# Patient Record
Sex: FEMALE | Race: WHITE
[De-identification: names, ages, dates, MRNs, and addresses within clinical notes are randomized per-mention and may not be internally consistent; named-entity substitution may affect disease eponyms.]

---

## 2017-07-29 ENCOUNTER — HOSPITAL ENCOUNTER (EMERGENCY)
Dept: HOSPITAL 17 - PHEFT | Age: 52
Discharge: HOME | End: 2017-07-29
Payer: COMMERCIAL

## 2017-07-29 VITALS
SYSTOLIC BLOOD PRESSURE: 119 MMHG | TEMPERATURE: 98.5 F | HEART RATE: 103 BPM | DIASTOLIC BLOOD PRESSURE: 66 MMHG | OXYGEN SATURATION: 97 % | RESPIRATION RATE: 20 BRPM

## 2017-07-29 DIAGNOSIS — M54.2: ICD-10-CM

## 2017-07-29 DIAGNOSIS — R51: ICD-10-CM

## 2017-07-29 DIAGNOSIS — M25.512: ICD-10-CM

## 2017-07-29 DIAGNOSIS — Z86.59: ICD-10-CM

## 2017-07-29 DIAGNOSIS — I10: ICD-10-CM

## 2017-07-29 DIAGNOSIS — M79.602: ICD-10-CM

## 2017-07-29 DIAGNOSIS — Z87.19: ICD-10-CM

## 2017-07-29 DIAGNOSIS — G58.8: Primary | ICD-10-CM

## 2017-07-29 PROCEDURE — 99283 EMERGENCY DEPT VISIT LOW MDM: CPT

## 2017-07-29 NOTE — PD
HPI


Chief Complaint:  Musculoskeletal Complaint


Time Seen by Provider:  16:35


Travel History


International Travel<30 days:  No


Contact w/Intl Traveler<30days:  No


Traveled to known affect area:  No





History of Present Illness


HPI


52-year-old female presents to the emergency room for evaluation of left-sided 

neck pain that radiates into the left upper shoulder and down her left arm for 

the past 2 days.  Patient woke up with the pain and believes she may have slept 

on her neck wrong.  She applied icy hot and took 400 mg ibuprofen yesterday and 

today without any relief in symptoms.  Pain is worsened with certain range of 

motion.  She has associated posterior headache from the strain.  She denies any 

trauma or injury to the area.  Denies paresthesias.  History of depression and 

hypertension.





PFSH


Past Medical History


Depression:  Yes


Diminished Hearing:  No


GERD:  Yes


Hypertension:  Yes


Pregnant?:  Not Pregnant





Past Surgical History


 Section:  Yes (X3)


Hysterectomy:  Yes (PARTIAL)


Tonsillectomy:  Yes


Other Surgery:  Yes (BLADDER LIFT)





Social History


Alcohol Use:  No


Tobacco Use:  No


Substance Use:  No





Allergies-Medications


(Allergen,Severity, Reaction):  


Coded Allergies:  


     Erythromycin (Verified  Allergy, Mild, NAUSEA, VOMITING, 17)


Reported Meds & Prescriptions





Reported Meds & Active Scripts


Active


Ibuprofen 600 Mg Tab 600 Mg PO Q8HR PRN


Robaxin (Methocarbamol) 750 Mg Tab 750 Mg PO Q8HR


Reported


Lisinopril 5 Mg Tab 5 Mg PO DAILY


Wellbutrin SR 12 HR (Bupropion HCl) 100 Mg Tab 300 Mg PO Q12HR








Review of Systems


Except as stated in HPI:  all other systems reviewed are Neg





Physical Exam


Narrative


GENERAL: Well-nourished, well-developed female in no acute distress.  Afebrile.

  Ambulatory.


SKIN: Focused skin assessment warm/dry.  No erythema or ecchymosis.


HEAD: Normocephalic.


EYES: No scleral icterus. No injection or drainage. 


NECK: Supple, trachea midline. No JVD or lymphadenopathy.  No midline 

tenderness.  Full range of motion.


CARDIOVASCULAR: Regular rate and rhythm without murmurs, gallops, or rubs. 


RESPIRATORY: Breath sounds equal bilaterally. No accessory muscle use.


MUSCULOSKELETAL: No cyanosis, or edema.  Strength 5/5 and equal in upper and 

lower extremities.  2+ radial pulse.  Full range of motion of the left upper 

extremity.  Radial, ulnar, and median nerves intact.


NEUROLOGICAL: Awake and alert. Cranial nerves II through XII intact.  Motor and 

sensory grossly within normal limits. Five out of 5 muscle strength in all 

muscle groups.  Normal speech.





Data


Data


Last Documented VS





Vital Signs








  Date Time  Temp Pulse Resp B/P Pulse Ox O2 Delivery O2 Flow Rate FiO2


 


17 16:22 98.5 103 20 119/66 97   











MDM


Medical Decision Making


Medical Screen Exam Complete:  Yes


Emergency Medical Condition:  Yes


Medical Record Reviewed:  Yes


Differential Diagnosis


Radiculopathy, strain, spasm


Narrative Course


52-year-old female presents to the emergency room for evaluation of left-sided 

neck pain radiating into her left shoulder and down the left upper extremity 

for the past 2 days.  Patient denies trauma or injury.  States she may have 

slept on it wrong.  Physical clinical exam is unremarkable.  No bony tenderness 

to palpation of the cervical spine or left shoulder.  2+ radial pulse.  Radial, 

ulnar, and median nerves intact.  Full range of motion of the shoulder and 

neck.  No indication for imaging at this time.  Patient discharged with 

prescriptions for ibuprofen and Robaxin and told to follow-up with a primary 

care physician or return for worsening symptoms.  She  understands and agrees 

to plan.





Diagnosis





 Primary Impression:  


 Pinched nerve in neck


Referrals:  


Primary Care Physician


Patient Instructions:  Cervical Strain (ED), General Instructions, Muscle Spasm 

(ED)





***Additional Instructions:


Rest and drink plenty of fluids.


Take Robaxin as directed, as needed for pain.


Take ibuprofen with food as directed, as needed for pain.


Apply ice to the affected area for 20 minutes at a time, as needed for pain and 

swelling.


Follow-up with a primary care physician.


Return to the emergency room for worsening symptoms.


***Med/Other Pt SpecificInfo:  Prescription(s) given


Scripts


Ibuprofen 600 Mg [id number] Mg PO Q8HR PRN (PAIN) #21 TAB  Ref 0


   Prov:Baltazar Godinez MD         17 


Methocarbamol (Robaxin)750 Mg [id number] Mg PO Q8HR  #21 TAB  Ref 0


   Prov:Baltazar Godinez MD         17


Disposition:  01 DISCHARGE HOME


Condition:  Stable








Juliana Gomez 2017 16:50

## 2018-05-26 ENCOUNTER — HOSPITAL ENCOUNTER (EMERGENCY)
Dept: HOSPITAL 17 - PHEFT | Age: 53
Discharge: HOME | End: 2018-05-26
Payer: COMMERCIAL

## 2018-05-26 VITALS
SYSTOLIC BLOOD PRESSURE: 119 MMHG | TEMPERATURE: 98.8 F | HEART RATE: 90 BPM | DIASTOLIC BLOOD PRESSURE: 68 MMHG | RESPIRATION RATE: 16 BRPM | OXYGEN SATURATION: 97 %

## 2018-05-26 VITALS — WEIGHT: 117.51 LBS | HEIGHT: 59 IN | BODY MASS INDEX: 23.69 KG/M2
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
## 2018-05-26 DIAGNOSIS — S61.412A: Primary | ICD-10-CM

## 2018-05-26 DIAGNOSIS — K21.9: ICD-10-CM

## 2018-05-26 DIAGNOSIS — F32.9: ICD-10-CM

## 2018-05-26 DIAGNOSIS — Z23: ICD-10-CM

## 2018-05-26 DIAGNOSIS — I10: ICD-10-CM

## 2018-05-26 PROCEDURE — 12001 RPR S/N/AX/GEN/TRNK 2.5CM/<: CPT

## 2018-05-26 PROCEDURE — 90471 IMMUNIZATION ADMIN: CPT

## 2018-05-26 PROCEDURE — 90714 TD VACC NO PRESV 7 YRS+ IM: CPT

## 2018-05-26 NOTE — PD
HPI


Chief Complaint:  Laceration/Skin Injury


Time Seen by Provider:  17:42


Travel History


International Travel<30 days:  No


Contact w/Intl Traveler<30days:  No


Traveled to known affect area:  No





History of Present Illness


HPI


53 year old female presents to the emergency department for evaluation of a 

laceration just medial to her left thumb.  She states she cut herself with 

scissors just prior to arrival.  She states her tetanus immunization is not up 

to date.  No other injury.  No other symptoms or complaints.  Mild severity.





PFSH


Past Medical History


Depression:  Yes


Diminished Hearing:  No


GERD:  Yes


Hypertension:  Yes


Tetanus Vaccination:  Unknown


Pregnant?:  Not Pregnant





Past Surgical History


 Section:  Yes (X3)


Hysterectomy:  Yes (PARTIAL)


Tonsillectomy:  Yes


Other Surgery:  Yes (BLADDER LIFT)





Social History


Alcohol Use:  No


Tobacco Use:  No


Substance Use:  No





Allergies-Medications


(Allergen,Severity, Reaction):  


Coded Allergies:  


     erythromycin base (Unverified  Allergy, Mild, NAUSEA, VOMITING, 18)


Reported Meds & Prescriptions





Reported Meds & Active Scripts


Active


Reported


Gemfibrozil 600 Mg Tab 600 Mg PO BIDAC


     Take 30 minutes prior to breakfast and dinner.


Losartan-Hydrochlorothiazide 50-12.5 Mg Tab 1 Tab PO DAILY


Wellbutrin SR 12 HR (Bupropion HCl) 150 Mg Tab 300 Mg PO DAILY








Review of Systems


Except as stated in HPI:  all other systems reviewed are Neg





Physical Exam


Narrative


GENERAL: Well-nourished, well-developed female patient, ambulatory.  Afebrile.


SKIN: Focused skin assessment warm/dry.  Patient has <0.5 cm superficial 

laceration just medial to her left thumb.


HEAD: Normocephalic.  Atraumatic.


EYES: No scleral icterus. No injection or drainage. 


RESPIRATORY: No accessory muscle use.


GASTROINTESTINAL: Abdomen soft, non-tender, nondistended. 


MUSCULOSKELETAL: No cyanosis, or edema.  She has full range of motion of all of 

her digits on her left hand.





Data


Data


Last Documented VS





Vital Signs








  Date Time  Temp Pulse Resp B/P (MAP) Pulse Ox O2 Delivery O2 Flow Rate FiO2


 


18 17:35 98.8 90 16 119/68 (85) 97   








Orders





 Orders


Tetanus/Diphtheria Tox Adult (Tetanus/Di (18 18:00)








[facility]


Medical Decision Making


Medical Screen Exam Complete:  Yes


Emergency Medical Condition:  Yes


Medical Record Reviewed:  Yes


Differential Diagnosis


laceration vs. abrasion vs. puncture wound


Narrative Course


53 year old female presents to the emergency department for evaluation of a 

laceration to her left hand.  Physical exam reveals a small, superficial 

laceration.  Dermabond is placed after wound is cleaned.  Patient's tetanus 

immunization is updated.





Procedures


**Procedure Narrative**


LACERATION


LOCATION: left hand


LENGTH: <0.5 cm


NUMBER OF STITCHES/STAPLES: dermabond





REPAIR: The area of the laceration was prepped with Betadine and sterilely 

draped.  The wound was copiously irrigated and explored without evidence of 

foreign body, tendon injury or neurovascular  


injury.  The wound was closed using dermabond.  This was a single layer repair. 

A sterile dressing was applied. The patient was advised to keep the dressing 

clean and dry. Patient tolerated the procedure well.





Diagnosis





 Primary Impression:  


 Hand laceration


 Qualified Codes:  S61.412A - Laceration without foreign body of left hand, 

initial encounter


Referrals:  


Primary Care Physician


call for appointment


Patient Instructions:  General Instructions, Laceration (ED)





***Additional Instructions:  


Do not soak or pick at skin glue.  It will come off on its own


Follow up with your primary care physician as needed


Return to the emergency department for any acute, worsening of symptoms


***Med/Other Pt SpecificInfo:  No Change to Meds


Disposition:  01 DISCHARGE HOME


Condition:  Stable











Wendy Olivia May 26, 2018 17:56